# Patient Record
Sex: FEMALE | Race: BLACK OR AFRICAN AMERICAN | NOT HISPANIC OR LATINO | URBAN - NONMETROPOLITAN AREA
[De-identification: names, ages, dates, MRNs, and addresses within clinical notes are randomized per-mention and may not be internally consistent; named-entity substitution may affect disease eponyms.]

---

## 2021-10-07 ENCOUNTER — IMPORTED ENCOUNTER (OUTPATIENT)
Dept: URBAN - NONMETROPOLITAN AREA CLINIC 1 | Facility: CLINIC | Age: 46
End: 2021-10-07

## 2021-10-07 PROBLEM — L03.213: Noted: 2021-10-07

## 2021-10-07 PROCEDURE — 99203 OFFICE O/P NEW LOW 30 MIN: CPT

## 2021-10-07 NOTE — PATIENT DISCUSSION
Spoke with Jenn Dawn CVNP regarding UO <30 x2 hours.  CVP reading 20s.  BP high stable.  V-paced at 70.  Orders to administer 40 mg IV Lasix 1 time.  Do no call back unless urine output  0 x2 hours.   periorbital cellulitis osimprovingeducate pt on findingspossible allergic responseokay to fill gtt and medrol dose marcellus if needed

## 2021-11-01 ENCOUNTER — IMPORTED ENCOUNTER (OUTPATIENT)
Dept: URBAN - NONMETROPOLITAN AREA CLINIC 1 | Facility: CLINIC | Age: 46
End: 2021-11-01

## 2021-11-01 PROBLEM — H52.221: Noted: 2021-11-01

## 2021-11-01 PROCEDURE — 92014 COMPRE OPH EXAM EST PT 1/>: CPT

## 2021-11-01 PROCEDURE — 92015 DETERMINE REFRACTIVE STATE: CPT

## 2021-11-01 NOTE — PATIENT DISCUSSION
Astigmatism-Discussed diagnosis with patient. Updated spec Rx given. Recommend lens that will provide comfort as well as protect safety and health of eyes. 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 -Reviewed symptoms of advancing cataract growth such as glare and halos and decreased vision.-Continue to monitor for now. Pt will notify us if any new symptoms develop.

## 2021-12-02 ENCOUNTER — IMPORTED ENCOUNTER (OUTPATIENT)
Dept: URBAN - NONMETROPOLITAN AREA CLINIC 1 | Facility: CLINIC | Age: 46
End: 2021-12-02

## 2021-12-02 PROBLEM — H52.221: Noted: 2021-12-02

## 2021-12-02 PROBLEM — H25.13: Noted: 2021-12-02

## 2021-12-02 PROCEDURE — 99214 OFFICE O/P EST MOD 30 MIN: CPT

## 2021-12-02 NOTE — PATIENT DISCUSSION
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 -Reviewed symptoms of advancing cataract growth such as glare and halos and decreased vision.-Continue to monitor for now. Pt will notify us if any new symptoms develop. HTN Vascular changes OU - discussed importance of keep blood pressure under control6 month Cat Check w glare testing

## 2022-04-09 ASSESSMENT — VISUAL ACUITY
OS_SC: 20/25
OS_CC: 20/50-1
OS_SC: 20/25
OD_SC: 20/20
OD_SC: 20/25
OD_CC: 20/30

## 2022-04-09 ASSESSMENT — TONOMETRY
OD_IOP_MMHG: 19
OS_IOP_MMHG: 19
OS_IOP_MMHG: 19
OD_IOP_MMHG: 16
OS_IOP_MMHG: 16
OD_IOP_MMHG: 19